# Patient Record
Sex: FEMALE | Race: WHITE
[De-identification: names, ages, dates, MRNs, and addresses within clinical notes are randomized per-mention and may not be internally consistent; named-entity substitution may affect disease eponyms.]

---

## 2020-12-07 ENCOUNTER — HOSPITAL ENCOUNTER (OUTPATIENT)
Dept: HOSPITAL 7 - FB.SDS | Age: 70
Discharge: HOME | End: 2020-12-07
Payer: MEDICARE

## 2020-12-07 DIAGNOSIS — D12.0: ICD-10-CM

## 2020-12-07 DIAGNOSIS — E78.1: ICD-10-CM

## 2020-12-07 DIAGNOSIS — E11.9: ICD-10-CM

## 2020-12-07 DIAGNOSIS — Z79.84: ICD-10-CM

## 2020-12-07 DIAGNOSIS — I10: ICD-10-CM

## 2020-12-07 DIAGNOSIS — F41.9: ICD-10-CM

## 2020-12-07 DIAGNOSIS — K57.30: ICD-10-CM

## 2020-12-07 DIAGNOSIS — Z79.899: ICD-10-CM

## 2020-12-07 DIAGNOSIS — Z12.11: Primary | ICD-10-CM

## 2020-12-07 DIAGNOSIS — D12.3: ICD-10-CM

## 2020-12-07 NOTE — OR
DATE OF OPERATION:  12/07/2020

 

SURGEON:  Umang Yusuf MD

 

PREOPERATIVE DIAGNOSIS:  History of colon polyps.

 

POSTOPERATIVE DIAGNOSES:

1. Colon polyps.

2. Sigmoid diverticulosis.

 

OPERATION PERFORMED:  Colonoscopy with polypectomy.

 

INDICATIONS FOR SURGERY:  This 70-year-old female has a known history of colon

polyps.  She comes today for surveillance colonoscopy.

 

FINDINGS:  Three polyps are noted on today's exam.  There is a 4 mm

semipedunculated polyp in the cecum, a 5 mm flat polyp in the proximal

transverse colon, and a 5 mm sessile polyp in the mid transverse colon.  The

patient also has extensive diverticulosis in the sigmoid region, although this

does not appear to be acutely inflamed or otherwise complicated.  The colon

otherwise appears normal.

 

PROCEDURE IN DETAIL:  The patient was taken to the procedure room.  She was

given intravenous sedation, and with her in the left lateral decubitus position,

digital rectal exam was performed showing no rectal masses.  The Olympus

colonoscope was inserted into the rectum.  A retroflexed examination of the

rectal canal was performed.  The scope was then carefully advanced under direct

visualization through the entire length of the colon until the cecum was

reached.  The colon was somewhat tortuous, and reaching the cecum did require

hand pressure, but was able to be safely accomplished.  Careful examination of

the cecum revealed the above-described small polyp.  This was removed in its

entirety with multiple bites of the biopsy forceps.  The identity of the cecum

was confirmed by noting the normal internal cecal anatomy including the

appendiceal orifice and the ileocecal valve.  The scope was then slowly

withdrawn, sequentially re-examining the colonic segments.  The polyp identified

in the proximal transverse colon was very flat.  This was destroyed with use of

cautery utilizing a snare.  The other polyp noted in the transverse colon was

able to be removed with a cautery snare and retrieved into a polyp trap.  The

examination was then completed, and after the entire colon and rectum had been

fully examined, and with no sign of any complication, the scope was removed, and

the patient was taken from the procedure room in satisfactory condition.

 

ESTIMATED BLOOD LOSS:  0.

 

COMPLICATIONS:  None.

 

PROGNOSIS:  Good.

 

Job#: [id number]

DD: 12/07/2020 0900

DT: 12/07/2020 1217 DELFINA/ASHLEY

## 2020-12-07 NOTE — PCM.PN
- General Info


Date of Service: 12/07/20





- Review of Systems


Systems Review Comment:: 





71 y/o female with personal history of colon polyps here for colonoscopy.  Her 

last colonoscopy was about 5 years ago.  She is medically stable to proceed.  

Her recent H and P was reviewed and no significant changes notes.  I have 

discussed the proposed colonoscopy with the patient.  Risks such as but not 

limited to bleeding and GI injury discussed and she agrees to proceed.





- Patient Data


Vitals - Most Recent: 


                                Last Vital Signs











Temp  98.3 F   12/07/20 07:25


 


Pulse  85   12/07/20 07:25


 


Resp  16   12/07/20 07:25


 


BP  118/77   12/07/20 07:25


 


Pulse Ox  98   12/07/20 07:25











Weight - Most Recent: 198 lb


Med Orders - Current: 


                               Current Medications





Lactated Ringer's (Ringers, Lactated)  1,000 mls @ 125 mls/hr IV ASDIRECTED WILLOW


   Last Admin: 12/07/20 07:44 Dose:  125 mls/hr


   Documented by: 


Sodium Chloride (Saline Flush)  10 ml FLUSH ASDIRECTED PRN


   PRN Reason: Keep Vein Open











Sepsis Event Note





- Focused Exam


Vital Signs: 


                                   Vital Signs











  Temp Pulse Resp BP Pulse Ox


 


 12/07/20 07:25  98.3 F  85  16  118/77  98














- Problem List Review


Problem List Initiated/Reviewed/Updated: Yes





- My Orders


Last 24 Hours: 


My Active Orders





12/07/20 06:45


Patient Status [ADT] Routine 


Blood Glucose Check, Bedside [RC] ONETIME 


Patient to Empty Bladder [RC] ASDIRECTED 


Verify Patient Consent Obtain [RC] ASDIRECTED 


Lactated Ringers [Ringers, Lactated] 1,000 ml IV ASDIRECTED 


Sodium Chloride 0.9% [Saline Flush]   10 ml FLUSH ASDIRECTED PRN 


Peripheral IV Insertion Adult [OM.PC] Routine 














- Assessment


Assessment:: 





History of colon polyps





- Plan


Plan:: 





Colonoscopy

## 2020-12-07 NOTE — PCM.OPNOTE
- General Post-Op/Procedure Note


Date of Surgery/Procedure: 12/07/20


Operative Procedure(s): Colonoscopy with polypectomy


Findings: 





Multiple colon polyps


Moderate to extensive sigmoid diverticulosis


Pre Op Diagnosis: History of colon polyps


Post-Op Diagnosis: Colon Polyps.  Sigmoid Diverticulosis


Anesthesia Technique: MAC


Primary Surgeon: Umang Yusuf


Pathology: 





Colon polyps


EBL in mLs: 0


Complications: None


Condition: Good